# Patient Record
Sex: FEMALE | Race: WHITE | NOT HISPANIC OR LATINO | URBAN - METROPOLITAN AREA
[De-identification: names, ages, dates, MRNs, and addresses within clinical notes are randomized per-mention and may not be internally consistent; named-entity substitution may affect disease eponyms.]

---

## 2024-05-14 ENCOUNTER — APPOINTMENT (RX ONLY)
Dept: URBAN - METROPOLITAN AREA CLINIC 53 | Facility: CLINIC | Age: 64
Setting detail: DERMATOLOGY
End: 2024-05-14

## 2024-05-14 DIAGNOSIS — L81.6 OTHER DISORDERS OF DIMINISHED MELANIN FORMATION: ICD-10-CM | Status: INADEQUATELY CONTROLLED

## 2024-05-14 DIAGNOSIS — L81.4 OTHER MELANIN HYPERPIGMENTATION: ICD-10-CM

## 2024-05-14 DIAGNOSIS — D22 MELANOCYTIC NEVI: ICD-10-CM

## 2024-05-14 DIAGNOSIS — Z41.9 ENCOUNTER FOR PROCEDURE FOR PURPOSES OTHER THAN REMEDYING HEALTH STATE, UNSPECIFIED: ICD-10-CM

## 2024-05-14 PROBLEM — D22.5 MELANOCYTIC NEVI OF TRUNK: Status: ACTIVE | Noted: 2024-05-14

## 2024-05-14 PROBLEM — D48.5 NEOPLASM OF UNCERTAIN BEHAVIOR OF SKIN: Status: ACTIVE | Noted: 2024-05-14

## 2024-05-14 PROCEDURE — ? RECOMMENDATIONS

## 2024-05-14 PROCEDURE — ? COSMETIC CONSULTATION: IPL

## 2024-05-14 PROCEDURE — ? FULL BODY SKIN EXAM - DECLINED

## 2024-05-14 PROCEDURE — ? TREATMENT REGIMEN

## 2024-05-14 PROCEDURE — 11102 TANGNTL BX SKIN SINGLE LES: CPT

## 2024-05-14 PROCEDURE — ? COSMETIC CONSULTATION: LASER RESURFACING

## 2024-05-14 PROCEDURE — 99203 OFFICE O/P NEW LOW 30 MIN: CPT | Mod: 25

## 2024-05-14 PROCEDURE — ? ADDITIONAL NOTES

## 2024-05-14 PROCEDURE — ? BIOPSY BY SHAVE METHOD

## 2024-05-14 PROCEDURE — ? COUNSELING

## 2024-05-14 PROCEDURE — ? COSMETIC QUOTE

## 2024-05-14 ASSESSMENT — LOCATION DETAILED DESCRIPTION DERM
LOCATION DETAILED: UPPER STERNUM
LOCATION DETAILED: INFERIOR THORACIC SPINE
LOCATION DETAILED: LEFT INFERIOR CENTRAL MALAR CHEEK
LOCATION DETAILED: LEFT INFERIOR MEDIAL FOREHEAD
LOCATION DETAILED: RIGHT INFERIOR CENTRAL MALAR CHEEK
LOCATION DETAILED: EPIGASTRIC SKIN
LOCATION DETAILED: LEFT PROXIMAL POSTERIOR UPPER ARM
LOCATION DETAILED: RIGHT PROXIMAL POSTERIOR UPPER ARM

## 2024-05-14 ASSESSMENT — LOCATION SIMPLE DESCRIPTION DERM
LOCATION SIMPLE: RIGHT POSTERIOR UPPER ARM
LOCATION SIMPLE: ABDOMEN
LOCATION SIMPLE: LEFT CHEEK
LOCATION SIMPLE: LEFT POSTERIOR UPPER ARM
LOCATION SIMPLE: LEFT FOREHEAD
LOCATION SIMPLE: RIGHT CHEEK
LOCATION SIMPLE: UPPER BACK
LOCATION SIMPLE: CHEST

## 2024-05-14 ASSESSMENT — LOCATION ZONE DERM
LOCATION ZONE: FACE
LOCATION ZONE: TRUNK
LOCATION ZONE: ARM
LOCATION ZONE: FACE

## 2024-05-14 NOTE — PROCEDURE: COSMETIC QUOTE
Body Procedure 1 Free Text Discount (In Dollars- Use Only Numbers And Decimals): 0.00
Breast Procedure 4 Units: 0
Face Procedure 8: Hydrafacial eye Perk add on
Misc Procedure 9: BioReLift
Misc Procedure 3: ZO Skin Brighter
Misc Procedure 6: Proscriptix Calm and Correct Phyto serum
Body Procedure 7 Price/Unit (In Dollars- Use Only Numbers And Decimals): 329.00
Body Procedure 4 Price/Unit (In Dollars- Use Only Numbers And Decimals): 350
Body Procedure 1 Price/Unit (In Dollars- Use Only Numbers And Decimals): 150
Laser 2 Price/Unit (In Dollars- Use Only Numbers And Decimals): 450
Apply Facility Fee: No
Face Procedure 7 Price/Unit (In Dollars- Use Only Numbers And Decimals): 92
Misc Procedure 8 Price/Unit (In Dollars- Use Only Numbers And Decimals): 35
Misc Procedure 2 Price/Unit (In Dollars- Use Only Numbers And Decimals): 109.20
Face Procedure 10 [place]/Unit (In Dollars- Use Only Numbers And Decimals): 389.00
Misc Procedure 5 Price/Unit (In Dollars- Use Only Numbers And Decimals): 35.00
Face Procedure 5 Price/Unit (In Dollars- Use Only Numbers And Decimals): 200
Laser 1 Units: 4
Face Procedure 2 Price/Unit (In Dollars- Use Only Numbers And Decimals): 250
Facility Fee Units (Optional): 1
Laser 10 Price/Unit (In Dollars- Use Only Numbers And Decimals): 225.00
Detail Level: Zone
Body Procedure 9: SkinPen Microneedling add on neck
Laser 4 Price/Unit (In Dollars- Use Only Numbers And Decimals): 50
Misc Procedure 1 Price/Unit (In Dollars- Use Only Numbers And Decimals): 12.00
Misc Procedure 7 Price/Unit (In Dollars- Use Only Numbers And Decimals): 173.00
Misc Procedure 4 Price/Unit (In Dollars- Use Only Numbers And Decimals): 123.00
Body Procedure 8: VI Peel Body Back
Body Procedure 5: VI Peel Body hands
Body Procedure 2: Hydrafacial Keravive Full Scalp with Peptide spray
Laser 3: Venus IPL Chest
Laser 9: Venus IPL broken capillaries around the nose
Laser 6: Venus Viva Nanofractional  around the Eyes
Face Procedure 4: Clinical anti-aging facial with extractions
Face Procedure 1: Hydrafacial Deluxe
Misc Procedure 6 Price/Unit (In Dollars- Use Only Numbers And Decimals): 60.00
Face Procedure 1 Price/Unit (In Dollars- Use Only Numbers And Decimals): 265
Body Procedure 7: SkinPen Microneedling Neck
Body Procedure 1: Back clinical Facial
Body Procedure 4: VI Peel Body Chest
Laser 2: Venus Viva  Nanofractional Face
Laser 5: Venus Viva Nanofractional Perioral
Face Procedure 5: SkinPen Microneedling scar
Laser 8: Venus Viva Nanofractional Neck
Face Procedure 2: BioRePeel
Face Procedure 7: Tretinoin.05%
Misc Procedure 2: Proscriptix Retinols Renewal 2.5X
Laser 10: Venus Viva Nanofractional add on Neck
Misc Procedure 8: Proscriptix Densifi Fx Volumizing Condotioner
Body Procedure 9 Price/Unit (In Dollars- Use Only Numbers And Decimals): 150.00
Face Procedure 10: VI Peel Precision Plus
Misc Procedure 5: Proscriptix Densifi Fx Volumizing Shampoo
Body Procedure 6 Price/Unit (In Dollars- Use Only Numbers And Decimals): 199.00
Body Procedure 6: Hydrafacial signature Back
Laser 1: Venus IPL Face
Body Procedure 3: microneedling left axilla
Include Sales Tax On Surgeon's Fees: Yes
Laser 4: Post care Products
Laser 7: Venus IPL neck add on
Face Procedure 3: Hydrafacial Platinum
Face Procedure 6: Benign Destruction
Misc Procedure 1: Proscriptix Activ Fx balancing cleanser
Face Procedure 9: SkinPen Microneedling
Misc Procedure 4: ZO Pigment control and Brightening crème
Misc Procedure 7: ZO Daily Power Defense
Body Procedure 8 Price/Unit (In Dollars- Use Only Numbers And Decimals): 550.00
Body Procedure 2 Price/Unit (In Dollars- Use Only Numbers And Decimals): 399.00
Laser 9 Price/Unit (In Dollars- Use Only Numbers And Decimals): 200.00
Face Procedure 8 Price/Unit (In Dollars- Use Only Numbers And Decimals): 65
Misc Procedure 3 Price/Unit (In Dollars- Use Only Numbers And Decimals): 130.0

## 2024-05-14 NOTE — PROCEDURE: BIOPSY BY SHAVE METHOD
Detail Level: Detailed
Depth Of Biopsy: dermis
Was A Bandage Applied: Yes
Size Of Lesion In Cm: 0.5
X Size Of Lesion In Cm: 0
Anticipated Plan (Based On Presumed Biopsy Results): Exc
Biopsy Type: H and E
Biopsy Method: double edge Personna blade
Anesthesia Type: 1% lidocaine with epinephrine
Hemostasis: Electrocautery and Aluminum Chloride
Wound Care: Petrolatum
Dressing: bandage
Destruction After The Procedure: No
Type Of Destruction Used: Curettage
Curettage Text: The wound bed was treated with curettage after the biopsy was performed.
Cryotherapy Text: The wound bed was treated with cryotherapy after the biopsy was performed.
Electrodesiccation Text: The wound bed was treated with electrodesiccation after the biopsy was performed.
Electrodesiccation And Curettage Text: The wound bed was treated with electrodesiccation and curettage after the biopsy was performed.
Silver Nitrate Text: The wound bed was treated with silver nitrate after the biopsy was performed.
Lab Facility: [id number]
Consent: Written consent was obtained and risks were reviewed including but not limited to scarring, infection, bleeding, scabbing, incomplete removal, nerve damage and allergy to anesthesia.
Post-Care Instructions: I reviewed with the patient in detail post-care instructions. Patient is to keep the biopsy site dry overnight, and then apply bacitracin twice daily until healed. Patient may apply hydrogen peroxide soaks to remove any crusting.
Notification Instructions: Patient will be notified of biopsy results. However, patient instructed to call the office if not contacted within 2 weeks.
Billing Type: Third-Party Bill
Information: Selecting Yes will display possible errors in your note based on the variables you have selected. This validation is only offered as a suggestion for you. PLEASE NOTE THAT THE VALIDATION TEXT WILL BE REMOVED WHEN YOU FINALIZE YOUR NOTE. IF YOU WANT TO FAX A PRELIMINARY NOTE YOU WILL NEED TO TOGGLE THIS TO 'NO' IF YOU DO NOT WANT IT IN YOUR FAXED NOTE.

## 2024-05-14 NOTE — PROCEDURE: ADDITIONAL NOTES
Detail Level: Zone
Additional Notes: Patients concerns are pigmentation, fine lines and wrinkles. Patient is aware that she benefits from a combination.  Patient to see Kaylie for Botox and Fillers consult.
Render Risk Assessment In Note?: no

## 2024-05-14 NOTE — PROCEDURE: RECOMMENDATIONS
Recommendations (Free Text): Venus IPL, Venus Viva Nanofractional,  consult with Kaylie for Botox and Fillers, Obagi Tretinoin.
Detail Level: Zone
Render Risk Assessment In Note?: no
Recommendation Preamble: The following recommendations were made during the visit:

## 2024-07-15 ENCOUNTER — RX ONLY (OUTPATIENT)
Age: 64
Setting detail: RX ONLY
End: 2024-07-15

## 2024-07-15 RX ORDER — CEPHALEXIN 500 MG/1
TABLET ORAL
Qty: 4 | Refills: 0 | Status: ERX | COMMUNITY
Start: 2024-07-15

## 2024-09-23 ENCOUNTER — APPOINTMENT (RX ONLY)
Dept: URBAN - METROPOLITAN AREA CLINIC 53 | Facility: CLINIC | Age: 64
Setting detail: DERMATOLOGY
End: 2024-09-23

## 2024-09-23 PROBLEM — C44.619 BASAL CELL CARCINOMA OF SKIN OF LEFT UPPER LIMB, INCLUDING SHOULDER: Status: ACTIVE | Noted: 2024-09-23

## 2024-09-23 PROCEDURE — ? CURETTAGE AND DESTRUCTION

## 2024-09-23 PROCEDURE — 17262 DSTRJ MAL LES T/A/L 1.1-2.0: CPT
